# Patient Record
Sex: MALE | Race: WHITE | NOT HISPANIC OR LATINO | Employment: FULL TIME | URBAN - METROPOLITAN AREA
[De-identification: names, ages, dates, MRNs, and addresses within clinical notes are randomized per-mention and may not be internally consistent; named-entity substitution may affect disease eponyms.]

---

## 2017-05-23 ENCOUNTER — TELEPHONE (OUTPATIENT)
Dept: FAMILY MEDICINE | Facility: CLINIC | Age: 37
End: 2017-05-23

## 2017-05-23 RX ORDER — OSELTAMIVIR PHOSPHATE 75 MG/1
75 CAPSULE ORAL DAILY
Qty: 10 CAPSULE | Refills: 0 | Status: SHIPPED | OUTPATIENT
Start: 2017-05-23 | End: 2017-06-02

## 2024-10-17 ENCOUNTER — OFFICE VISIT (OUTPATIENT)
Dept: URGENT CARE | Facility: CLINIC | Age: 44
End: 2024-10-17

## 2024-10-17 VITALS
WEIGHT: 218 LBS | DIASTOLIC BLOOD PRESSURE: 90 MMHG | TEMPERATURE: 99 F | HEIGHT: 70 IN | BODY MASS INDEX: 31.21 KG/M2 | OXYGEN SATURATION: 96 % | RESPIRATION RATE: 18 BRPM | HEART RATE: 100 BPM | SYSTOLIC BLOOD PRESSURE: 159 MMHG

## 2024-10-17 DIAGNOSIS — J10.1 INFLUENZA A: ICD-10-CM

## 2024-10-17 DIAGNOSIS — R52 BODY ACHES: Primary | ICD-10-CM

## 2024-10-17 LAB
CTP QC/QA: YES
CTP QC/QA: YES
POC MOLECULAR INFLUENZA A AGN: POSITIVE
POC MOLECULAR INFLUENZA B AGN: NEGATIVE
SARS-COV-2 AG RESP QL IA.RAPID: NEGATIVE

## 2024-10-17 RX ORDER — OSELTAMIVIR PHOSPHATE 75 MG/1
75 CAPSULE ORAL 2 TIMES DAILY
Qty: 10 CAPSULE | Refills: 0 | Status: SHIPPED | OUTPATIENT
Start: 2024-10-17 | End: 2024-10-22

## 2024-10-17 NOTE — PROGRESS NOTES
"Subjective:      Patient ID: Chago Ndiaye Jr. is a 43 y.o. male.    Vitals:  height is 5' 10" (1.778 m) and weight is 98.9 kg (218 lb). His tympanic temperature is 98.7 °F (37.1 °C). His blood pressure is 159/90 (abnormal) and his pulse is 100. His respiration is 18 and oxygen saturation is 96%.     Chief Complaint: Fever (Entered by patient)    Pt states that he is having fever and body aches that started 2 days ago.  Pt states that he is taking cold/flu and motrin . Requesting testing. Leaving on cruise this weekend.     Fever   This is a new problem. The current episode started 2 days ago. The problem occurs constantly. The problem has been unchanged. The maximum temperature noted was 100 to 100.9 F. The temperature was taken using an oral thermometer. He has tried NSAIDs for the symptoms. The treatment provided mild relief.       Constitution: Positive for fever.      Objective:     Physical Exam   Constitutional: He is oriented to person, place, and time.   HENT:   Head: Normocephalic.   Ears:   Right Ear: External ear normal.   Left Ear: External ear normal.   Nose: Nose normal.   Mouth/Throat: Mucous membranes are moist.   Eyes: Conjunctivae are normal.   Cardiovascular: Normal rate.   Pulmonary/Chest: Effort normal.   Musculoskeletal: Normal range of motion.         General: Normal range of motion.   Neurological: He is alert and oriented to person, place, and time.   Skin: Skin is dry.   Psychiatric: His behavior is normal.     Results for orders placed or performed in visit on 10/17/24   POCT Influenza A/B MOLECULAR    Collection Time: 10/17/24  9:19 AM   Result Value Ref Range    POC Molecular Influenza A Ag Positive (A) Negative    POC Molecular Influenza B Ag Negative Negative     Acceptable Yes    SARS Coronavirus 2 Antigen, POCT Manual Read    Collection Time: 10/17/24  9:20 AM   Result Value Ref Range    SARS Coronavirus 2 Antigen Negative Negative     " Acceptable Yes        Assessment:     1. Body aches    2. Influenza A        Plan:       Body aches  -     SARS Coronavirus 2 Antigen, POCT Manual Read  -     POCT Influenza A/B MOLECULAR    Influenza A  -     oseltamivir (TAMIFLU) 75 MG capsule; Take 1 capsule (75 mg total) by mouth 2 (two) times daily. for 5 days  Dispense: 10 capsule; Refill: 0        Tylenol/mortrin prn fever pain.   Supportive care   RTC PRN